# Patient Record
Sex: MALE | Race: ASIAN | NOT HISPANIC OR LATINO | URBAN - METROPOLITAN AREA
[De-identification: names, ages, dates, MRNs, and addresses within clinical notes are randomized per-mention and may not be internally consistent; named-entity substitution may affect disease eponyms.]

---

## 2019-11-21 ENCOUNTER — EMERGENCY (EMERGENCY)
Age: 34
LOS: 1 days | Discharge: HOME | End: 2019-11-21
Attending: EMERGENCY MEDICINE | Admitting: EMERGENCY MEDICINE
Payer: MEDICAID

## 2019-11-21 VITALS
DIASTOLIC BLOOD PRESSURE: 79 MMHG | TEMPERATURE: 98 F | SYSTOLIC BLOOD PRESSURE: 106 MMHG | HEART RATE: 57 BPM | RESPIRATION RATE: 16 BRPM | OXYGEN SATURATION: 100 %

## 2019-11-21 VITALS — DIASTOLIC BLOOD PRESSURE: 82 MMHG | SYSTOLIC BLOOD PRESSURE: 118 MMHG

## 2019-11-21 PROCEDURE — 99285 EMERGENCY DEPT VISIT HI MDM: CPT

## 2019-11-21 NOTE — ED PROVIDER NOTE - CARE PLAN
Principal Discharge DX:	Vasovagal episode  Assessment and plan of treatment:	Thank you for visiting our Emergency Department, it has been a pleasure taking part in your healthcare.    Your discharge diagnosis is: vasovagal episode  Please take all discharge medications as indicated below:  Please continue all medications as rx'd by your PMD.  Please follow up with your PMD within x48 hours.  A copy of resulted labs, imaging, and findings have been provided to you.   You have had a detailed discussion with your provider regarding your diagnosis, care management and discharge planning including, but not limited to: return precautions, follow up visits with existing or new providers, new prescriptions and/or medication changes, wound and/or splint/cast care or other care   aspects specific to your diagnosis and treatment. You have been given the opportunity to have your questions answered. At this time you have been deemed stable and fit for discharge.  Return precautions to the Emergency Department include but are not limited to: unrelenting nausea, vomiting, fever, chills, chest pain, shortness of breath, dizziness, chest or abdominal pain, worsening back pain, syncope, blood in urine or stool, headache that doesn't resolve, numbness or tingling, loss of sensation, loss of motor function, or any other concerning symptoms.

## 2019-11-21 NOTE — ED ADULT TRIAGE NOTE - CHIEF COMPLAINT QUOTE
pt s/p syncopal episode. denies head injury. noted to be hypotensive upon RRT arrival. right ac 20 in place with fluids infusing

## 2019-11-21 NOTE — ED PROVIDER NOTE - ATTENDING CONTRIBUTION TO CARE
32 yo M with no PMHx here s/p vasovagal episode which he experienced as he witnessed his son getting his blood drawn. the pt is now back to baseline with nml vitals prior to discharge.   I performed a history and physical exam of the patient and discussed their management with the resident. I reviewed the resident's note and agree with the documented findings and plan of care. My medical decision making and observations are found above.

## 2019-11-21 NOTE — ED PROVIDER NOTE - CLINICAL SUMMARY MEDICAL DECISION MAKING FREE TEXT BOX
Marty PGY3: 3M no pmh presents as a rapid response from [facility] for episode of hypotension and near syncope per staff at [facility] pt's son was getting blood drawn when pt got lightheaded dizzy and collapsed, at that time BP noted to be 70's SBP, and bradycardic vitals rapidly improved after 1L NS. exam vss non toxic BP improved after 1L, feels well, likely vasovagal syncope in setting of son getting blood drawn, pt to steven w pmd, will dc. Marty PGY3: 33M no pmh presents as a rapid response from [facility] for episode of hypotension and near syncope per staff at [facility] pt's son was getting blood drawn when pt got lightheaded dizzy and collapsed, at that time BP noted to be 70's SBP, and bradycardic vitals rapidly improved after 1L NS. exam vss non toxic BP improved after 1L, feels well, likely vasovagal syncope in setting of son getting blood drawn, pt to [facility]even w pmd, will dc.

## 2019-11-21 NOTE — ED PEDIATRIC TRIAGE NOTE - CHIEF COMPLAINT QUOTE
Patient was with child at bedside, stated he wasn't feeling well and passed out. Adult Rapid Response called. Transported with adult rapid response team, initial BP's low.

## 2019-11-21 NOTE — ED PROVIDER NOTE - PLAN OF CARE
Thank you for visiting our Emergency Department, it has been a pleasure taking part in your healthcare.    Your discharge diagnosis is: vasovagal episode  Please take all discharge medications as indicated below:  Please continue all medications as rx'd by your PMD.  Please follow up with your PMD within x48 hours.  A copy of resulted labs, imaging, and findings have been provided to you.   You have had a detailed discussion with your provider regarding your diagnosis, care management and discharge planning including, but not limited to: return precautions, follow up visits with existing or new providers, new prescriptions and/or medication changes, wound and/or splint/cast care or other care   aspects specific to your diagnosis and treatment. You have been given the opportunity to have your questions answered. At this time you have been deemed stable and fit for discharge.  Return precautions to the Emergency Department include but are not limited to: unrelenting nausea, vomiting, fever, chills, chest pain, shortness of breath, dizziness, chest or abdominal pain, worsening back pain, syncope, blood in urine or stool, headache that doesn't resolve, numbness or tingling, loss of sensation, loss of motor function, or any other concerning symptoms.

## 2019-11-21 NOTE — ED STATDOCS - PROGRESS NOTE DETAILS
34yo male no pmhx but per pt hx of syncope x 1 in past now with episode of syncope while seated, observing urine cath being performed on his son. did not head head. Was not immediately easily arousable. Adult Rapid response activated. Initial vitals included BP 87/36 and haven to 46. dstick 75.  Iv inserted and one liter normal saline bolus given. Nl s1s2 and lungs clear. vitals upon leaving PEDS ED 94/56, HR 62. O2 sat always at 100%RA. Karmen Owens, DO

## 2019-11-21 NOTE — ED PROVIDER NOTE - PATIENT PORTAL LINK FT
You can access the FollowMyHealth Patient Portal offered by [facility] by registering at the following website: [email or web address]. By joining [employer]’s FollowMyHealth portal, you will also be able to view your health information using other applications (apps) compatible with our system.

## 2019-11-21 NOTE — ED PROVIDER NOTE - OBJECTIVE STATEMENT
33M no pmh presents as a rapid response from [facility] for episode of hypotension and near syncope per staff at [facility] pt's son was getting blood drawn when pt got lightheaded dizzy and collapsed, at that time BP noted to be 70's SBP, and bradycardic vitals rapidly improved after 1L NS. Pt transferred to [facility] ED for eval, on eval pt is asymptomatic, feels better. No prior cardiac hx. Asking to be discharged. Denies n/v/f/c/cp/sob. Denies headache, lightheadedness, dizziness. Denies chest palpitations, abdominal pain. Denies dysuria, hematuria, hematochezia, BRBPR, tarry stools, diarrhea, constipation.

## 2022-10-06 PROBLEM — Z00.00 ENCOUNTER FOR PREVENTIVE HEALTH EXAMINATION: Status: ACTIVE | Noted: 2022-10-06

## 2022-10-10 ENCOUNTER — APPOINTMENT (OUTPATIENT)
Dept: CARDIOLOGY | Facility: CLINIC | Age: 37
End: 2022-10-10

## 2024-03-08 ENCOUNTER — TRANSCRIPTION ENCOUNTER (OUTPATIENT)
Age: 39
End: 2024-03-08

## 2024-03-08 ENCOUNTER — EMERGENCY (EMERGENCY)
Facility: HOSPITAL | Age: 39
LOS: 1 days | Discharge: HOME | End: 2024-03-08
Attending: EMERGENCY MEDICINE | Admitting: EMERGENCY MEDICINE
Payer: MEDICAID

## 2024-03-08 VITALS
TEMPERATURE: 98 F | HEART RATE: 82 BPM | OXYGEN SATURATION: 97 % | DIASTOLIC BLOOD PRESSURE: 74 MMHG | SYSTOLIC BLOOD PRESSURE: 116 MMHG | RESPIRATION RATE: 18 BRPM

## 2024-03-08 PROCEDURE — 99284 EMERGENCY DEPT VISIT MOD MDM: CPT

## 2024-03-08 RX ORDER — POLYMYXIN B SULF/TRIMETHOPRIM 10000-1/ML
1 DROPS OPHTHALMIC (EYE) ONCE
Refills: 0 | Status: COMPLETED | OUTPATIENT
Start: 2024-03-08 | End: 2024-03-08

## 2024-03-08 RX ADMIN — Medication 1 DROP(S): at 17:39

## 2024-03-08 NOTE — ED PROVIDER NOTE - OBJECTIVE STATEMENT
39 y/o male c/o R eye redness x 4 days. Pt was walking on the street and felt R eye burning. Pt denies having a FB sensation at that time. The burning improved but then became worse again. Pt now c/o irritation, redness and clear drainage. Denies change in vision, eye pain, dizziness, syncope, fever or chills.

## 2024-03-08 NOTE — ED ADULT TRIAGE NOTE - CHIEF COMPLAINT QUOTE
Patient c/o right eye watery and redness x 4 days. PHX- HLD. Patient denies itching to right eye, denies any trauma to right eye. Patient c/o right side headache x 1 day. Denies CP, no SOB noted.

## 2024-03-08 NOTE — ED PROVIDER NOTE - ATTENDING APP SHARED VISIT CONTRIBUTION OF CARE
Brief HPI:  38-year-old male no past medical history presents for right eye redness for 4 days.  Patient was walking on the street and felt right eye burning, acute onset.  Denies trauma or foreign body sensation.  Burning initially improved but then worsened again.  Reports some visual blurriness, with clear thin drainage.  There is no associated nonacute onset, not maximal onset right-sided headache.  Denies eyeglasses or contact lens use.  Denies fever, chills, nausea, vomiting.    Vitals:   Reviewed    Exam:    GEN:  Non-toxic appearing, non-distressed, speaking full sentences, non-diaphoretic, AAOx3  HEENT:  NCAT, neck supple  OD:  Acuity 20/20.  No visual field deficit.  EOMI intact without pain.  No +No conjunctival injection.  Sclera anicteric.  Woods lamp with linear area of uptake in inferior conjunctiva, michaela negative.  No visible foreign body.   OS:  Acuity 20/20.  No visual field deficit.  EOMI intact without pain.  No APD.  No conjunctival injection.  Sclera anicteric.    CV:  regular rhythm and rate, s1/s2 audible, no murmurs, rubs or gallops, peripheral pulses 2+ and symmetric  PULM:  non-labored respirations, lungs clear to auscultation bilaterally, no wheezes, crackles or rales  ABD:  non distended, non-tender, no rebound, no guarding, negative Cervantes's sign, bowel sounds normal, no cvat  MSK:  no gross deformity, non-tender extremities and joints, range of motion grossly normal appearing, no extremity edema, extremities warm and well perfused   NEURO:  AAOx3, CN II-XII intact, motor 5/5 in upper and lower extremities bilaterally, sensation grossly intact in extremities and trunk, finger to nose testing wnl, no nystagmus, negative Romberg, no pronator drift, no gait deficit  SKIN:  warm, dry, no rash or vesicles     A/P:  38-year-old male no past medical history presents for right eye redness for 4 days.  Vital signs stable.  No acute deficits.  Right eyes injected with linear area of fluorescein uptake.  Possible conjunctival abrasion.  No concern for globe rupture.  Will treat with antibiotics, have patient follow-up with ophthalmology.  Return precautions given.

## 2024-03-08 NOTE — ED PROVIDER NOTE - NSFOLLOWUPINSTRUCTIONS_ED_ALL_ED_FT
Corneal Abrasion    WHAT YOU NEED TO KNOW:    What is a corneal abrasion? A corneal abrasion is a scratch on the cornea of your eye. The cornea is the clear layer that covers the front of your eye.  Eye Anatomy    What causes a corneal abrasion?    Contact lenses that do not fit well, are damaged, or are worn too long    A scratch or poke from a fingernail or objects such as a pencil or tree branch    Objects such as dirt, sand, or metal shavings that get into your eye    Rubbing your eyes too hard    Chemical or flash burns caused by extreme heat  What are the signs and symptoms of a corneal abrasion?    Pain, redness, or swelling of the eye    Difficulty opening the eye    More tears than usual    A feeling that you have something in your eye    Blurred vision    Sensitivity to bright light    Headache  How is a corneal abrasion diagnosed? Your healthcare provider will examine your eye. If you have something in your eye that is scratching your cornea, your healthcare provider will remove it. He or she may put dye in your eye and look at it with a light. The light and dye can help your provider see if your cornea has been scratched.      How is a corneal abrasion treated? You may be given antibiotic eyedrops or ointment to help prevent an eye infection. You may also be given eyedrops to decrease pain. A small scratch may heal in 1 to 2 days. Deeper or larger scratches may take longer to heal.    What can I do to care for my eyes?    Get regular eye exams. Get your eyes checked at least every year.    Eat healthy foods. Fresh fruits and vegetables that are rich in vitamins A and C may help with your vision. Foods such as sweet potatoes, apricots, and carrots are rich in nutrients for the eyes.  Sources of Vitamin A  Sources of Vitamin C      Take care of your contacts or glasses. Store, clean, and use your contacts or glasses as directed. Replace your glasses or contact lenses as often as your healthcare provider suggests.    Decrease eye strain. Rest your eyes, especially after you read or use a computer for long periods of time. Get plenty of sleep at night. Use lights that reduce glare in your home, school, or workplace.    Wear dark sunglasses. This will help prevent pain and light sensitivity. Make sure the sunglasses have UVA and UVB protection. This will protect your eyes when you go outside.    Use eyedrops safely. If your treatment plan includes eyedrops, it is important to use them as directed. Your provider may give you detailed instructions to follow. The eyedrops may also come with safety instructions. Follow all instructions to help prevent an infection. Do not touch the tip of the bottle to your eye. Germs from your eye can spread to the medicine bottle.  Steps 1 2 3 4  How can I help prevent corneal abrasions?    Remove your contact lenses if your eyes feel dry or irritated.    Wash your hands if you need to touch your eyes or your face.  Handwashing      Trim your fingernails so you cannot scratch your eye.    Wear protective eyewear when you work with chemicals, wood, dust, or metal.    Wear protective eyewear when you play sports.    Do not wear your contacts for longer than you should.    Do not sleep with your contacts in.    Do not wear glitter makeup. Glitter can easily get into your eyes and under contact lenses.  When should I call my doctor?    Your eye pain or vision gets worse.    You have yellow or green drainage from your eye.    You have questions or concerns about your condition or care.  CARE AGREEMENT:    You have the right to help plan your care. Learn about your health condition and how it may be treated. Discuss treatment options with your healthcare providers to decide what care you want to receive. You always have the right to refuse treatment.

## 2024-03-08 NOTE — ED PROVIDER NOTE - PATIENT PORTAL LINK FT
You can access the FollowMyHealth Patient Portal offered by [facility] by registering at the following website: [email or web address]. By joining [employer]’s FollowMyHealth portal, you will also be able to view your health information using other applications (apps) compatible with our system.

## 2024-03-08 NOTE — ED PROVIDER NOTE - CLINICAL SUMMARY MEDICAL DECISION MAKING FREE TEXT BOX
37 y/o male c/o R eye redness and irritation x4 days, no visual changes, denies FB sensation. Pt is well appearing, nad, afebrile, R eye injected, PERRLA, EOMI, no discharge, + conjunctival abrasion on woods lamp- will treat with polytrim, refer to ophtho. stable for dc.

## 2024-03-08 NOTE — ED PROVIDER NOTE - NSFOLLOWUPCLINICS_GEN_ALL_ED_FT
[facility]  Ophthalmology  [address] [place] [address], [facility] [address]  [place] [address]  Phone: [phone]  Fax:

## 2024-04-27 ENCOUNTER — EMERGENCY (EMERGENCY)
Facility: HOSPITAL | Age: 39
LOS: 1 days | Discharge: HOME | End: 2024-04-27
Admitting: EMERGENCY MEDICINE
Payer: MEDICAID

## 2024-04-27 VITALS
TEMPERATURE: 98 F | HEART RATE: 64 BPM | OXYGEN SATURATION: 98 % | SYSTOLIC BLOOD PRESSURE: 104 MMHG | DIASTOLIC BLOOD PRESSURE: 69 MMHG | RESPIRATION RATE: 14 BRPM

## 2024-04-27 PROCEDURE — 99284 EMERGENCY DEPT VISIT MOD MDM: CPT

## 2024-04-27 PROCEDURE — 71046 X-RAY EXAM CHEST 2 VIEWS: CPT | Mod: 26

## 2024-04-27 RX ORDER — ALBUTEROL 90 UG/1
2 AEROSOL, METERED ORAL ONCE
Refills: 0 | Status: COMPLETED | OUTPATIENT
Start: 2024-04-27 | End: 2024-04-27

## 2024-04-27 RX ORDER — CEFUROXIME AXETIL 250 MG
250 TABLET ORAL ONCE
Refills: 0 | Status: COMPLETED | OUTPATIENT
Start: 2024-04-27 | End: 2024-04-27

## 2024-04-27 RX ORDER — CEFUROXIME AXETIL 250 MG
1 TABLET ORAL
Qty: 20 | Refills: 0
Start: 2024-04-27 | End: 2024-05-06

## 2024-04-27 RX ADMIN — Medication 200 MILLIGRAM(S): at 18:45

## 2024-04-27 RX ADMIN — Medication 250 MILLIGRAM(S): at 20:24

## 2024-04-27 RX ADMIN — ALBUTEROL 2 PUFF(S): 90 AEROSOL, METERED ORAL at 19:46

## 2024-04-27 NOTE — ED PROVIDER NOTE - NSFOLLOWUPINSTRUCTIONS_ED_ALL_ED_FT
Follow up with your primary doctor, take the antibiotic and cough medication as prescribed. Continue all previously prescribed medications as directed.  Follow up with your primary care physician in 48-72 hours- bring copies of your results.  Return to the ER for worsening or persistent symptoms, and/or ANY NEW OR CONCERNING SYMPTOMS. THIS INCLUDES BUT IS NOT LIMITED TO FEVER, CHILLS, NIGHTSWEATS, WORSENING COUGH OR FOR ANY OTHER SYMPTOMS THAT CONCERN YOU. If you have issues obtaining follow up, please call: [phone] to obtain a doctor or specialist who takes your insurance in your area.  You may call [phone] to make an appointment with the internal medicine clinic.

## 2024-04-27 NOTE — ED PROVIDER NOTE - CLINICAL SUMMARY MEDICAL DECISION MAKING FREE TEXT BOX
39 Y/O M PMH GERD HLD and frequent sinusitis presents with a cough for the past week. Pt states for the past 3 days he has noticed blood streaks in his sputum which he states is green in color. Plan is CXR to R/O consolidation, low suspicion for TB , blood is associated with green sputum, no fevers/weight loss/nightsweats, likely due to another acute infectious process though will eval for cavitation on cxr. Will give Benzonate as an antitussive, reassess.

## 2024-04-27 NOTE — ED PROVIDER NOTE - OBJECTIVE STATEMENT
37 Y/O M PMH GERD HLD and frequent sinusitis presents with a cough for the past week. Pt states for the past 3 days he has noticed blood streaks in his sputum which he states is green in color. Pt denies fever/chills/nightsweats/weight loss/SOB or any other sx or acute complaints. Pt states he has not yet been evaluated for his condition prior to coming to the ER today.

## 2024-04-27 NOTE — ED PROVIDER NOTE - PATIENT PORTAL LINK FT
You can access the FollowMyHealth Patient Portal offered by [facility] by registering at the following website: [email or web address]. By joining [employer]’s FollowMyHealth portal, you will also be able to view your health information using other applications (apps) compatible with our system.

## 2024-04-27 NOTE — ED ADULT NURSE NOTE - CHIEF COMPLAINT QUOTE
Patient c/o cough x 1 week, reports blood-tinged sputum x 3 days. No signs of respiratory distress. Denies fever, chest pain, SOB. Phx GERD, HLD, denies anticoagulation

## 2024-04-27 NOTE — ED ADULT NURSE NOTE - OBJECTIVE STATEMENT
Patient c/o cough x 1 week, blood tinge sputum x 3 days. Breathing non-labored. Denies fevers, chills. Med given and tolerated well. Plan of care in progress.

## 2024-05-28 ENCOUNTER — EMERGENCY (EMERGENCY)
Facility: HOSPITAL | Age: 39
LOS: 1 days | Discharge: HOME | End: 2024-05-28
Admitting: EMERGENCY MEDICINE
Payer: MEDICAID

## 2024-05-28 VITALS
RESPIRATION RATE: 18 BRPM | SYSTOLIC BLOOD PRESSURE: 99 MMHG | OXYGEN SATURATION: 98 % | HEART RATE: 77 BPM | DIASTOLIC BLOOD PRESSURE: 65 MMHG | TEMPERATURE: 99 F

## 2024-05-28 PROCEDURE — 93010 ELECTROCARDIOGRAM REPORT: CPT

## 2024-05-28 PROCEDURE — 99284 EMERGENCY DEPT VISIT MOD MDM: CPT

## 2024-05-28 RX ORDER — IBUPROFEN 200 MG
600 TABLET ORAL ONCE
Refills: 0 | Status: COMPLETED | OUTPATIENT
Start: 2024-05-28 | End: 2024-05-28

## 2024-05-28 RX ORDER — ALBUTEROL 90 UG/1
1 AEROSOL, METERED ORAL ONCE
Refills: 0 | Status: COMPLETED | OUTPATIENT
Start: 2024-05-28 | End: 2024-05-28

## 2024-05-28 RX ORDER — LORATADINE 10 MG/1
10 TABLET ORAL ONCE
Refills: 0 | Status: COMPLETED | OUTPATIENT
Start: 2024-05-28 | End: 2024-05-28

## 2024-05-28 RX ADMIN — ALBUTEROL 1 PUFF(S): 90 AEROSOL, METERED ORAL at 20:54

## 2024-05-28 RX ADMIN — LORATADINE 10 MILLIGRAM(S): 10 TABLET ORAL at 20:54

## 2024-05-28 RX ADMIN — Medication 600 MILLIGRAM(S): at 20:54

## 2024-05-28 NOTE — ED PROVIDER NOTE - OBJECTIVE STATEMENT
39 y/o male no pmh c/o cough x1 week. Pt admit sto productive cough with yellowish mucous, mixed with small amount of blood x2 days. Pt states he had bronchitis 39 y/o male no pmh c/o cough x1 week. Pt admit sto productive cough with yellowish mucous, mixed with small amount of blood x2 days. Pt states he had bronchitis x 1 month ago and was given montelukast by his PMD which helped. Pt sates the cough returned over the last week but the medication has been helping. Pt also admit sto throat/upper chest pressure when coughing. Pt denies sob, diaphoresis, n/v/d, numbness, tingling, weakness, dizziness, syncope, fever or chills.

## 2024-05-28 NOTE — ED ADULT TRIAGE NOTE - INTERNATIONAL TRAVEL
Constipation:   -Increase fiber intake.   - Begin fiber supplement: psyllium (Metamucil) 1.7 g once daily then slowly increase to 3 times daily  -drink at least 64 oz of water per day     No

## 2024-05-28 NOTE — ED PROVIDER NOTE - PATIENT PORTAL LINK FT
You can access the FollowMyHealth Patient Portal offered by [facility] by registering at the following website: [email or web address]. By joining [employer]s FollowMyHealth portal, you will also be able to view your health information using other applications (apps) compatible with our system.

## 2024-05-28 NOTE — ED PROVIDER NOTE - ENMT, MLM
Hypertensive urgency
Airway patent, Nasal mucosa clear. Mouth with normal mucosa. Throat has no vesicles, no oropharyngeal exudates and uvula is midline.

## 2024-05-28 NOTE — ED PROVIDER NOTE - CLINICAL SUMMARY MEDICAL DECISION MAKING FREE TEXT BOX
37 y/o male no pmh c/o cough x1 week, productive with sputum, now mixed with small amount of blood x 2 daysd. No sob, n/v/d, fever or chills. Pt has been taking monteleukast with some effect. Pt is well appearing, nad, afebrile. lungs cta b/l, abd soft nt nd, no neuro deficits, no rash. Symptoms likely 2/2 viral URI, no concern for PNA at this time. will treat with albuterol, nsaids, rest and fluids. yusra.

## 2024-05-28 NOTE — ED ADULT NURSE NOTE - OBJECTIVE STATEMENT
patient presents to ED A&04 ambualtory at baseline coming in complaining of productive yellowish mucous cough x1 week. respirations even and unlabored, appears in NAD. No complaints of chest pain, headache, nausea, dizziness, vomiting  SOB, fever, chills verbalized. medicated as ordered.

## 2024-05-28 NOTE — ED ADULT TRIAGE NOTE - CHIEF COMPLAINT QUOTE
c/o midsternal chest pain, cough for the past 2 months reports being treated with montelukast with no relief, recently developed blood in sputum.

## 2025-01-16 ENCOUNTER — APPOINTMENT (OUTPATIENT)
Dept: UROLOGY | Facility: CLINIC | Age: 40
End: 2025-01-16

## 2025-01-16 VITALS
TEMPERATURE: 97.4 F | HEIGHT: 62 IN | WEIGHT: 138 LBS | HEART RATE: 76 BPM | OXYGEN SATURATION: 97 % | BODY MASS INDEX: 25.4 KG/M2 | DIASTOLIC BLOOD PRESSURE: 75 MMHG | SYSTOLIC BLOOD PRESSURE: 115 MMHG

## 2025-01-16 DIAGNOSIS — N48.1 BALANITIS: ICD-10-CM

## 2025-01-16 PROCEDURE — 99204 OFFICE O/P NEW MOD 45 MIN: CPT

## 2025-01-16 PROCEDURE — G2211 COMPLEX E/M VISIT ADD ON: CPT | Mod: NC

## 2025-01-16 RX ORDER — FLUCONAZOLE 200 MG/1
200 TABLET ORAL
Qty: 1 | Refills: 0 | Status: ACTIVE | COMMUNITY
Start: 2025-01-16 | End: 1900-01-01

## 2025-01-16 RX ORDER — CLOTRIMAZOLE AND BETAMETHASONE DIPROPIONATE 10; .5 MG/G; MG/G
1-0.05 CREAM TOPICAL
Qty: 15 | Refills: 0 | Status: ACTIVE | COMMUNITY
Start: 2025-01-16 | End: 1900-01-01

## 2025-01-17 LAB
APPEARANCE: CLEAR
BACTERIA: NEGATIVE /HPF
BILIRUBIN URINE: NEGATIVE
BLOOD URINE: NEGATIVE
CAST: 0 /LPF
COLOR: YELLOW
EPITHELIAL CELLS: 0 /HPF
GLUCOSE QUALITATIVE U: NEGATIVE MG/DL
KETONES URINE: NEGATIVE MG/DL
LEUKOCYTE ESTERASE URINE: NEGATIVE
MICROSCOPIC-UA: NORMAL
NITRITE URINE: NEGATIVE
PH URINE: 7
PROTEIN URINE: NEGATIVE MG/DL
RED BLOOD CELLS URINE: 0 /HPF
SPECIFIC GRAVITY URINE: 1.01
UROBILINOGEN URINE: 0.2 MG/DL
WHITE BLOOD CELLS URINE: 0 /HPF

## 2025-01-19 LAB — BACTERIA UR CULT: NORMAL

## 2025-03-06 ENCOUNTER — APPOINTMENT (OUTPATIENT)
Dept: UROLOGY | Facility: CLINIC | Age: 40
End: 2025-03-06

## 2025-03-14 ENCOUNTER — APPOINTMENT (OUTPATIENT)
Dept: UROLOGY | Facility: CLINIC | Age: 40
End: 2025-03-14

## 2025-04-24 ENCOUNTER — APPOINTMENT (OUTPATIENT)
Dept: UROLOGY | Facility: CLINIC | Age: 40
End: 2025-04-24
Payer: COMMERCIAL

## 2025-04-24 DIAGNOSIS — N48.1 BALANITIS: ICD-10-CM

## 2025-04-24 DIAGNOSIS — N47.1 PHIMOSIS: ICD-10-CM

## 2025-04-24 PROCEDURE — 99214 OFFICE O/P EST MOD 30 MIN: CPT

## 2025-04-24 PROCEDURE — G2211 COMPLEX E/M VISIT ADD ON: CPT | Mod: NC

## 2025-04-24 RX ORDER — CLOTRIMAZOLE AND BETAMETHASONE DIPROPIONATE 10; .5 MG/G; MG/G
1-0.05 CREAM TOPICAL TWICE DAILY
Qty: 1 | Refills: 0 | Status: ACTIVE | COMMUNITY
Start: 2025-04-24 | End: 1900-01-01

## 2025-04-24 RX ORDER — FLUCONAZOLE 100 MG/1
100 TABLET ORAL
Qty: 8 | Refills: 0 | Status: ACTIVE | COMMUNITY
Start: 2025-04-24 | End: 1900-01-01

## 2025-05-22 ENCOUNTER — APPOINTMENT (OUTPATIENT)
Dept: UROLOGY | Facility: CLINIC | Age: 40
End: 2025-05-22

## 2025-05-29 ENCOUNTER — APPOINTMENT (OUTPATIENT)
Dept: UROLOGY | Facility: CLINIC | Age: 40
End: 2025-05-29